# Patient Record
Sex: FEMALE | URBAN - METROPOLITAN AREA
[De-identification: names, ages, dates, MRNs, and addresses within clinical notes are randomized per-mention and may not be internally consistent; named-entity substitution may affect disease eponyms.]

---

## 2022-03-04 ENCOUNTER — OFFICE VISIT (OUTPATIENT)
Dept: URBAN - METROPOLITAN AREA CLINIC 13 | Facility: CLINIC | Age: 57
End: 2022-03-04
Payer: COMMERCIAL

## 2022-03-04 DIAGNOSIS — H35.81 RETINAL COTTON WOOL SPOTS: Primary | ICD-10-CM

## 2022-03-04 DIAGNOSIS — H25.13 AGE-RELATED NUCLEAR CATARACT, BILATERAL: ICD-10-CM

## 2022-03-04 PROCEDURE — 92134 CPTRZ OPH DX IMG PST SGM RTA: CPT | Performed by: OPHTHALMOLOGY

## 2022-03-04 PROCEDURE — 99203 OFFICE O/P NEW LOW 30 MIN: CPT | Performed by: OPHTHALMOLOGY

## 2022-03-04 ASSESSMENT — INTRAOCULAR PRESSURE
OD: 16
OS: 14

## 2022-03-04 NOTE — IMPRESSION/PLAN
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 Plan: Dr. Isha Martins, thanks for referring this nice woman. She has a cotton wool spot vs myelinated nerve fiber layer along the inferior vascular arcade OS. OCT confirms no CME/SRF OU and color photos document the lesion. We discussed the findings. Fortunately this is  benign and no nevus is present. She denies DM/HTN. I recommend observation. She will call us with any new visual changes. thanks Dr. Isha Martins RTC 3 months OCT OU Re-eval 

 Prior notes from other provider(s) have been reviewed in conjunction with today's visit.

## 2022-07-14 ENCOUNTER — OFFICE VISIT (OUTPATIENT)
Dept: URBAN - METROPOLITAN AREA CLINIC 35 | Facility: CLINIC | Age: 57
End: 2022-07-14
Payer: COMMERCIAL

## 2022-07-14 DIAGNOSIS — H25.13 AGE-RELATED NUCLEAR CATARACT, BILATERAL: ICD-10-CM

## 2022-07-14 DIAGNOSIS — H35.81 RETINAL COTTON WOOL SPOTS: Primary | ICD-10-CM

## 2022-07-14 PROCEDURE — 92134 CPTRZ OPH DX IMG PST SGM RTA: CPT | Performed by: OPHTHALMOLOGY

## 2022-07-14 PROCEDURE — 99213 OFFICE O/P EST LOW 20 MIN: CPT | Performed by: OPHTHALMOLOGY

## 2022-07-14 ASSESSMENT — INTRAOCULAR PRESSURE
OD: 17
OS: 18

## 2022-07-14 NOTE — IMPRESSION/PLAN
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 Plan: Her cotton wool spot along the inferior vascular arcade OS has now resolved. OCT confirms no CME/SRF OU. We discussed the findings. She denies DM/HTN. I recommend observation. She will call us with any new visual changes. She will f/u with your excellent care. I am  happy to see her again anytime. thanks Dr. Coreen Juarez RTC PRN